# Patient Record
Sex: FEMALE | Race: WHITE
[De-identification: names, ages, dates, MRNs, and addresses within clinical notes are randomized per-mention and may not be internally consistent; named-entity substitution may affect disease eponyms.]

---

## 2017-01-03 ENCOUNTER — HOSPITAL ENCOUNTER (OUTPATIENT)
Dept: HOSPITAL 47 - RADUSMAIN | Age: 32
Discharge: HOME | End: 2017-01-03
Payer: COMMERCIAL

## 2017-01-03 DIAGNOSIS — R10.2: Primary | ICD-10-CM

## 2017-01-03 PROCEDURE — 76830 TRANSVAGINAL US NON-OB: CPT

## 2017-01-04 NOTE — US
EXAMINATION TYPE: US transvaginal

 

DATE OF EXAM: 1/3/2017 6:02 PM

 

COMPARISON: NONE

 

CLINICAL HISTORY: pelvic pain

 

TECHNIQUE:  Transvaginal (TV)

 

Date of LMP:  12/21/16

 

EXAM MEASUREMENTS:

 

Uterus:  7.6 x 4.7 x 6.6cm

Endometrial Stripe: 0.9cm

Right Ovary:  3.3 x 2.2 x 1.5cm

Left Ovary:  3.2 x 

 

FINDINGS:

 

TECHNOLOGIST IMPRESSION:

 

1. Uterus:  Anteverted  

2. Endometrium:  wnl

3. Right Ovary:  wnl

4. Left Ovary:  wnl

5. Bilateral Adnexa:  wnl

6. Posterior cul-de-sac:  wnl

 

IMPRESSION: 

 

Normal pelvic ultrasound.

 

Normal Values:

  Uterine Length: < 10cm

  Endometrium:  

     Proliferative (Day 6 ? 14):  4 ? 6mm

    Secretory (Day 15 ? 28): 7 ? 14mm

    Post Menopausal (and not symptomatic): up to 8mm

    Post Menopausal (with vaginal bleeding): upper limits <5mm

    Post Menopausal with HRT:  upper limits 8 - 15mm

Post Menopausal with tamoxifen: < 6mm (although 50% of those receiving tamoxifen have been reported t
o have thickness >8mm)

## 2020-11-25 ENCOUNTER — HOSPITAL ENCOUNTER (EMERGENCY)
Dept: HOSPITAL 47 - EC | Age: 35
Discharge: HOME | End: 2020-11-25
Payer: COMMERCIAL

## 2020-11-25 VITALS — SYSTOLIC BLOOD PRESSURE: 108 MMHG | DIASTOLIC BLOOD PRESSURE: 65 MMHG | HEART RATE: 77 BPM

## 2020-11-25 VITALS — RESPIRATION RATE: 16 BRPM | TEMPERATURE: 97.7 F

## 2020-11-25 DIAGNOSIS — Z88.0: ICD-10-CM

## 2020-11-25 DIAGNOSIS — M54.2: ICD-10-CM

## 2020-11-25 DIAGNOSIS — Z91.040: ICD-10-CM

## 2020-11-25 DIAGNOSIS — R55: Primary | ICD-10-CM

## 2020-11-25 DIAGNOSIS — F17.200: ICD-10-CM

## 2020-11-25 LAB
ALBUMIN SERPL-MCNC: 3.7 G/DL (ref 3.5–5)
ALP SERPL-CCNC: 44 U/L (ref 38–126)
ALT SERPL-CCNC: 11 U/L (ref 4–34)
ANION GAP SERPL CALC-SCNC: 5 MMOL/L
APTT BLD: 21.8 SEC (ref 22–30)
AST SERPL-CCNC: 19 U/L (ref 14–36)
BASOPHILS # BLD AUTO: 0.1 K/UL (ref 0–0.2)
BASOPHILS NFR BLD AUTO: 1 %
BUN SERPL-SCNC: 14 MG/DL (ref 7–17)
CALCIUM SPEC-MCNC: 8.6 MG/DL (ref 8.4–10.2)
CHLORIDE SERPL-SCNC: 109 MMOL/L (ref 98–107)
CK SERPL-CCNC: 47 U/L (ref 30–135)
CO2 SERPL-SCNC: 23 MMOL/L (ref 22–30)
EOSINOPHIL # BLD AUTO: 0.3 K/UL (ref 0–0.7)
EOSINOPHIL NFR BLD AUTO: 4 %
ERYTHROCYTE [DISTWIDTH] IN BLOOD BY AUTOMATED COUNT: 4.13 M/UL (ref 3.8–5.4)
ERYTHROCYTE [DISTWIDTH] IN BLOOD: 13.1 % (ref 11.5–15.5)
GLUCOSE SERPL-MCNC: 95 MG/DL (ref 74–99)
HCT VFR BLD AUTO: 38.8 % (ref 34–46)
HGB BLD-MCNC: 13.2 GM/DL (ref 11.4–16)
HYALINE CASTS UR QL AUTO: 6 /LPF (ref 0–2)
INR PPP: 1.3 (ref ?–1.2)
LYMPHOCYTES # SPEC AUTO: 2.5 K/UL (ref 1–4.8)
LYMPHOCYTES NFR SPEC AUTO: 34 %
MAGNESIUM SPEC-SCNC: 1.9 MG/DL (ref 1.6–2.3)
MCH RBC QN AUTO: 32 PG (ref 25–35)
MCHC RBC AUTO-ENTMCNC: 34 G/DL (ref 31–37)
MCV RBC AUTO: 94.1 FL (ref 80–100)
MONOCYTES # BLD AUTO: 0.3 K/UL (ref 0–1)
MONOCYTES NFR BLD AUTO: 5 %
NEUTROPHILS # BLD AUTO: 4.1 K/UL (ref 1.3–7.7)
NEUTROPHILS NFR BLD AUTO: 56 %
PH UR: 6 [PH] (ref 5–8)
PLATELET # BLD AUTO: 240 K/UL (ref 150–450)
POTASSIUM SERPL-SCNC: 4.5 MMOL/L (ref 3.5–5.1)
PROT SERPL-MCNC: 6.6 G/DL (ref 6.3–8.2)
PROT UR QL: (no result)
PT BLD: 12.9 SEC (ref 9–12)
RBC UR QL: 1 /HPF (ref 0–5)
SODIUM SERPL-SCNC: 137 MMOL/L (ref 137–145)
SP GR UR: 1.03 (ref 1–1.03)
SQUAMOUS UR QL AUTO: 21 /HPF (ref 0–4)
UROBILINOGEN UR QL STRIP: 2 MG/DL (ref ?–2)
WBC # BLD AUTO: 7.3 K/UL (ref 3.8–10.6)
WBC #/AREA URNS HPF: 42 /HPF (ref 0–5)

## 2020-11-25 PROCEDURE — 85025 COMPLETE CBC W/AUTO DIFF WBC: CPT

## 2020-11-25 PROCEDURE — 84484 ASSAY OF TROPONIN QUANT: CPT

## 2020-11-25 PROCEDURE — 85730 THROMBOPLASTIN TIME PARTIAL: CPT

## 2020-11-25 PROCEDURE — 36415 COLL VENOUS BLD VENIPUNCTURE: CPT

## 2020-11-25 PROCEDURE — 85610 PROTHROMBIN TIME: CPT

## 2020-11-25 PROCEDURE — 80053 COMPREHEN METABOLIC PANEL: CPT

## 2020-11-25 PROCEDURE — 72125 CT NECK SPINE W/O DYE: CPT

## 2020-11-25 PROCEDURE — 93005 ELECTROCARDIOGRAM TRACING: CPT

## 2020-11-25 PROCEDURE — 81001 URINALYSIS AUTO W/SCOPE: CPT

## 2020-11-25 PROCEDURE — 70450 CT HEAD/BRAIN W/O DYE: CPT

## 2020-11-25 PROCEDURE — 96374 THER/PROPH/DIAG INJ IV PUSH: CPT

## 2020-11-25 PROCEDURE — 71046 X-RAY EXAM CHEST 2 VIEWS: CPT

## 2020-11-25 PROCEDURE — 81025 URINE PREGNANCY TEST: CPT

## 2020-11-25 PROCEDURE — 96375 TX/PRO/DX INJ NEW DRUG ADDON: CPT

## 2020-11-25 PROCEDURE — 82550 ASSAY OF CK (CPK): CPT

## 2020-11-25 PROCEDURE — 96361 HYDRATE IV INFUSION ADD-ON: CPT

## 2020-11-25 PROCEDURE — 99285 EMERGENCY DEPT VISIT HI MDM: CPT

## 2020-11-25 PROCEDURE — 83735 ASSAY OF MAGNESIUM: CPT

## 2020-11-25 NOTE — ED
Dizziness HPI





- General


Chief Complaint: Syncope


Stated Complaint: Syncope


Time Seen by Provider: 11/25/20 05:56


Source: patient, EMS, RN notes reviewed, old records reviewed


Mode of arrival: EMS


Limitations: no limitations





- History of Present Illness


Initial Comments: 





Patient is a 35-year-old female who presents emergency department today for a 

syncopal versus seizure episode lasted 30 seconds.  Patient reports that she has

been dealing with neck pain and spasms draining down the left arm and her back 

and neck.  She reports it's worse with any movement of her neck.  She saw her 

primary care doctor and was prescribed Flexeril and Tylenol codeine.  She 

reports she's been taking them for the past 24 hours.  She reports that she was 

awake at 4:00 due to neck pain and decided to walk around outside and smoke and 

took a Flexeril.  Patient was awake with her  who then reported she had 

an episode where she had her eyes roll back in her head had 30 seconds of 

shaking and fell to the ground.  He did call EMS.  She denies any seizure 

history.  Patient denies any cardiac history.  She does complain of nausea.





- Related Data


                                Home Medications











 Medication  Instructions  Recorded  Confirmed


 


Ashwagandha Root Extract 300 mg PO DAILY 11/25/20 11/25/20


 


Cholecalciferol [Vitamin D3 (25 1,000 unit PO DAILY 11/25/20 11/25/20





Mcg = 1000 Iu)]   


 


Omega-3 Fatty Acids [Omega-3] 1,000 mg PO DAILY 11/25/20 11/25/20


 


Rhodiola 1 cap PO DAILY 11/25/20 11/25/20








                                  Previous Rx's











 Medication  Instructions  Recorded


 


Ondansetron Odt [Zofran Odt] 4 mg PO Q12HR PRN #12 tab 11/25/20


 


dexAMETHasone [Dexamethasone] 0.75 mg PO DAILY #12 tab 11/25/20











                                    Allergies











Allergy/AdvReac Type Severity Reaction Status Date / Time


 


latex Allergy  Unknown Verified 11/25/20 07:40


 


Penicillins Allergy  Rash/Hives Verified 11/25/20 07:40














Review of Systems


ROS Statement: 


Those systems with pertinent positive or pertinent negative responses have been 

documented in the HPI.





ROS Other: All systems not noted in ROS Statement are negative.





Past Medical History


Past Medical History: No Reported History


History of Any Multi-Drug Resistant Organisms: None Reported


Past Surgical History: No Surgical Hx Reported


Smoking Status: Current every day smoker


Past Alcohol Use History: None Reported


Past Drug Use History: None Reported





General Exam





- General Exam Comments


Initial Comments: 





Alert and oriented 35-year-old female.  No distress.


Limitations: no limitations


General appearance: alert, in no apparent distress


Head exam: Present: atraumatic, normocephalic, normal inspection


Eye exam: Present: normal appearance, PERRL, EOMI.  Absent: scleral icterus, 

conjunctival injection, periorbital swelling


ENT exam: Present: normal exam, mucous membranes moist


Neck exam: Present: normal inspection, other (Chest tenderness and stiffness of 

her neck tender to palpation over the paraspinous cervical muscles).  Absent: 

tenderness, meningismus, lymphadenopathy


Respiratory exam: Present: normal lung sounds bilaterally.  Absent: respiratory 

distress, wheezes, rales, rhonchi, stridor


Cardiovascular Exam: Present: regular rate, normal rhythm, normal heart sounds. 

 Absent: systolic murmur, diastolic murmur, rubs, gallop, clicks


GI/Abdominal exam: Present: soft, normal bowel sounds.  Absent: distended, 

tenderness, guarding, rebound, rigid


Extremities exam: Present: normal inspection, full ROM, normal capillary refill.

  Absent: tenderness, pedal edema, joint swelling, calf tenderness


Back exam: Present: normal inspection


Neurological exam: Present: alert, oriented X3, CN II-XII intact


Psychiatric exam: Present: normal affect, normal mood


Skin exam: Present: warm, dry, intact, normal color.  Absent: rash





Course


                                   Vital Signs











  11/25/20 11/25/20





  05:47 07:18


 


Temperature 97.7 F 


 


Pulse Rate 78 77


 


Respiratory 16 16





Rate  


 


Blood Pressure 127/90 108/65


 


O2 Sat by Pulse 100 99





Oximetry  














Medical Decision Making





- Medical Decision Making





Patient is a 35-year-old female who presents the emergency department today for 

evaluation with complaints of syncopal versus seizure episode.  She reports that

 she's been having neck pain for the past few days and worse with movement.  She

 reports that she was started on Flexeril and Tylenol with Codeine by her 

primary care doctor yesterday.  Patient had a dose of that at 4:00 this morning 

and then had a syncopal episode while walking around outside.  Patient arrives e

mergency department alert and oriented.  The complaint is neck discomfort.  

Denies any chest pain.  EKG and lab work was reviewed and unremarkable.  

Urinalysis will be cultured as it is somewhat contaminated.  Patient's CT brain 

and C-spine reviewed and negative for any acute process.  I discussed the 

Patient needs to follow-up with primary care doctor in regards to seem to be 

versus seizure-like episode but I have low suspicion for seizures lab values and

 presentation.  Patient was advised to use anti-inflammatory medication for her 

back pain and continue Tylenol codeine as prescribed.  I discussed she needs to 

have close PCP follow-up today or tomorrow. Discussed return parameters. 





- Lab Data


Result diagrams: 


                                 11/25/20 06:22





                                 11/25/20 06:22


                                   Lab Results











  11/25/20 11/25/20 11/25/20 Range/Units





  06:22 06:22 06:22 


 


WBC  7.3    (3.8-10.6)  k/uL


 


RBC  4.13    (3.80-5.40)  m/uL


 


Hgb  13.2    (11.4-16.0)  gm/dL


 


Hct  38.8    (34.0-46.0)  %


 


MCV  94.1    (80.0-100.0)  fL


 


MCH  32.0    (25.0-35.0)  pg


 


MCHC  34.0    (31.0-37.0)  g/dL


 


RDW  13.1    (11.5-15.5)  %


 


Plt Count  240    (150-450)  k/uL


 


MPV  6.9    


 


Neutrophils %  56    %


 


Lymphocytes %  34    %


 


Monocytes %  5    %


 


Eosinophils %  4    %


 


Basophils %  1    %


 


Neutrophils #  4.1    (1.3-7.7)  k/uL


 


Lymphocytes #  2.5    (1.0-4.8)  k/uL


 


Monocytes #  0.3    (0-1.0)  k/uL


 


Eosinophils #  0.3    (0-0.7)  k/uL


 


Basophils #  0.1    (0-0.2)  k/uL


 


PT   12.9 H   (9.0-12.0)  sec


 


INR   1.3 H   (<1.2)  


 


APTT   21.8 L   (22.0-30.0)  sec


 


Sodium     (137-145)  mmol/L


 


Potassium     (3.5-5.1)  mmol/L


 


Chloride     ()  mmol/L


 


Carbon Dioxide     (22-30)  mmol/L


 


Anion Gap     mmol/L


 


BUN     (7-17)  mg/dL


 


Creatinine     (0.52-1.04)  mg/dL


 


Est GFR (CKD-EPI)AfAm     (>60 ml/min/1.73 sqM)  


 


Est GFR (CKD-EPI)NonAf     (>60 ml/min/1.73 sqM)  


 


Glucose     (74-99)  mg/dL


 


Calcium     (8.4-10.2)  mg/dL


 


Magnesium     (1.6-2.3)  mg/dL


 


Total Bilirubin     (0.2-1.3)  mg/dL


 


AST     (14-36)  U/L


 


ALT     (4-34)  U/L


 


Alkaline Phosphatase     ()  U/L


 


Creatine Kinase     ()  U/L


 


Troponin I     (0.000-0.034)  ng/mL


 


Total Protein     (6.3-8.2)  g/dL


 


Albumin     (3.5-5.0)  g/dL


 


Urine Color    Yellow  


 


Urine Appearance    Cloudy H  (Clear)  


 


Urine pH    6.0  (5.0-8.0)  


 


Ur Specific Gravity    1.035  (1.001-1.035)  


 


Urine Protein    1+ H  (Negative)  


 


Urine Glucose (UA)    Negative  (Negative)  


 


Urine Ketones    Negative  (Negative)  


 


Urine Blood    Negative  (Negative)  


 


Urine Nitrite    Negative  (Negative)  


 


Urine Bilirubin    Negative  (Negative)  


 


Urine Urobilinogen    2.0  (<2.0)  mg/dL


 


Ur Leukocyte Esterase    Large H  (Negative)  


 


Urine RBC    1  (0-5)  /hpf


 


Urine WBC    42 H  (0-5)  /hpf


 


Ur Squamous Epith Cells    21 H  (0-4)  /hpf


 


Urine Bacteria    Rare H  (None)  /hpf


 


Hyaline Casts    6 H  (0-2)  /lpf


 


Urine Mucus    Many H  (None)  /hpf


 


Urine HCG, Qual     (Not Detectd)  














  11/25/20 11/25/20 11/25/20 Range/Units





  06:22 06:22 06:22 


 


WBC     (3.8-10.6)  k/uL


 


RBC     (3.80-5.40)  m/uL


 


Hgb     (11.4-16.0)  gm/dL


 


Hct     (34.0-46.0)  %


 


MCV     (80.0-100.0)  fL


 


MCH     (25.0-35.0)  pg


 


MCHC     (31.0-37.0)  g/dL


 


RDW     (11.5-15.5)  %


 


Plt Count     (150-450)  k/uL


 


MPV     


 


Neutrophils %     %


 


Lymphocytes %     %


 


Monocytes %     %


 


Eosinophils %     %


 


Basophils %     %


 


Neutrophils #     (1.3-7.7)  k/uL


 


Lymphocytes #     (1.0-4.8)  k/uL


 


Monocytes #     (0-1.0)  k/uL


 


Eosinophils #     (0-0.7)  k/uL


 


Basophils #     (0-0.2)  k/uL


 


PT     (9.0-12.0)  sec


 


INR     (<1.2)  


 


APTT     (22.0-30.0)  sec


 


Sodium  137    (137-145)  mmol/L


 


Potassium  4.5    (3.5-5.1)  mmol/L


 


Chloride  109 H    ()  mmol/L


 


Carbon Dioxide  23    (22-30)  mmol/L


 


Anion Gap  5    mmol/L


 


BUN  14    (7-17)  mg/dL


 


Creatinine  0.71    (0.52-1.04)  mg/dL


 


Est GFR (CKD-EPI)AfAm  >90    (>60 ml/min/1.73 sqM)  


 


Est GFR (CKD-EPI)NonAf  >90    (>60 ml/min/1.73 sqM)  


 


Glucose  95    (74-99)  mg/dL


 


Calcium  8.6    (8.4-10.2)  mg/dL


 


Magnesium  1.9    (1.6-2.3)  mg/dL


 


Total Bilirubin  0.5    (0.2-1.3)  mg/dL


 


AST  19    (14-36)  U/L


 


ALT  11    (4-34)  U/L


 


Alkaline Phosphatase  44    ()  U/L


 


Creatine Kinase  47    ()  U/L


 


Troponin I   <0.012   (0.000-0.034)  ng/mL


 


Total Protein  6.6    (6.3-8.2)  g/dL


 


Albumin  3.7    (3.5-5.0)  g/dL


 


Urine Color     


 


Urine Appearance     (Clear)  


 


Urine pH     (5.0-8.0)  


 


Ur Specific Gravity     (1.001-1.035)  


 


Urine Protein     (Negative)  


 


Urine Glucose (UA)     (Negative)  


 


Urine Ketones     (Negative)  


 


Urine Blood     (Negative)  


 


Urine Nitrite     (Negative)  


 


Urine Bilirubin     (Negative)  


 


Urine Urobilinogen     (<2.0)  mg/dL


 


Ur Leukocyte Esterase     (Negative)  


 


Urine RBC     (0-5)  /hpf


 


Urine WBC     (0-5)  /hpf


 


Ur Squamous Epith Cells     (0-4)  /hpf


 


Urine Bacteria     (None)  /hpf


 


Hyaline Casts     (0-2)  /lpf


 


Urine Mucus     (None)  /hpf


 


Urine HCG, Qual    Not Detected  (Not Detectd)  














11/25/20 06:32


EKG shows sinus rhythm normal ECG.  Ventricular rate of 75 bpm.


AL interval is 156 ms.  QRS duration is 88 ms.  QT QTc is 42/448 ms.





- Radiology Data


Radiology results: report reviewed


fracture-dislocation evidence cervical spine.  No acute intracranial hemorrhage 

mass effect or midline shift is seen.  No acute cardiogram primary process.





Disposition


Clinical Impression: 


 Syncope and collapse, Neck pain on left side





Disposition: HOME SELF-CARE


Condition: Good


Instructions (If sedation given, give patient instructions):  Syncope (ED), 

Cervical Strain (ED)


Additional Instructions: 


Please use medication as discussed. Please follow up with family doctor  within 

1-2 days. Use heat and ice over neck and proceed with massage. Use Tylenol 3 and

 motrin for pain.  Please return to the emergency room if your symptoms increase

 or worsen or for any other concerns.


Prescriptions: 


dexAMETHasone [Dexamethasone] 0.75 mg PO DAILY #12 tab


Ondansetron Odt [Zofran Odt] 4 mg PO Q12HR PRN #12 tab


 PRN Reason: Nausea


Is patient prescribed a controlled substance at d/c from ED?: No


Referrals: 


Goran Velasquez DO [Primary Care Provider] - 1-2 days


Time of Disposition: 08:03

## 2020-11-25 NOTE — XR
EXAMINATION TYPE: XR chest 2V

 

DATE OF EXAM: 11/25/2020

 

COMPARISON: NONE

 

HISTORY: Syncope and weakness.

 

TECHNIQUE:  Frontal and lateral views of the chest are obtained.

 

FINDINGS:  There is no focal air space opacity, pleural effusion, or pneumothorax seen.  The cardiac 
silhouette size is within normal limits.   The osseous structures are intact.

 

IMPRESSION:  No acute cardiopulmonary process.

## 2020-11-25 NOTE — CT
EXAMINATION TYPE: CT brain chadine wo con

 

DATE OF EXAM: 11/25/2020

 

COMPARISON: NONE

 

HISTORY: Fall injury with headache and neck pain. Syncope.

 

CT DLP: 1351 mGycm. Automated Exposure Control for Dose Reduction was Utilized.

 

 

TECHNIQUE: CT scan of the head and cervical spine are performed without contrast.

 

FINDINGS:   There is no acute intracranial hemorrhage, mass effect, or midline shift identified.  The
 ventricles and sulci are within normal limits in size. Gray-white matter differentiation is maintain
ed. The calvarium is intact. The globes are intact and the visualized sinuses are clear.

 

Cervical spine is visualized in its entirety from C1 through upper thoracic levels and demonstrates l
oss of normal cervical curvature without evidence of acute fracture or dislocation.  Prevertebral sof
t tissue appears within normal limits.  The C1-C2 articulation is within normal limits on the coronal
 images.  Vertebral body heights and disc space heights are maintained. Spinal canal is grossly prese
rved. Axial images. Within normal limits. Thyroid gland is heterogeneous and slightly enlarged in siz
e, correlate for underlying goiter. Lung apices show no pneumothorax.

 

IMPRESSION:

1. There is no acute fracture or dislocation evident in the cervical spine.

2. No acute intracranial hemorrhage, mass effect, or midline shift is seen.